# Patient Record
(demographics unavailable — no encounter records)

---

## 2025-01-07 NOTE — PHYSICAL EXAM
[Low Lying Soft Palate] : low lying soft palate [Enlarged Base of the Tongue] : enlargement of the base of the tongue [II] : II [Heart Sounds] : normal S1 and S2 [Murmurs] : no murmurs [] : no respiratory distress [Auscultation Breath Sounds / Voice Sounds] : lungs were clear to auscultation bilaterally [No Focal Deficits] : no focal deficits [Oriented To Time, Place, And Person] : oriented to person, place, and time [Memory Recent] : recent memory was not impaired

## 2025-01-07 NOTE — HISTORY OF PRESENT ILLNESS
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  Patient is sleepy with [place] sleepiness score of 11.  Patient does not know if he snores as he sleeps alone, does not know if there are witnessed apneas, when he sleeps in supine position he feels the airway is closing.  Patient's bedtime is around 9.30-10 PM wakes up in the morning around 5.30 AM.   He feels rested when he wakes up.  Patient drinks 2 cups of coffee during the daytime. Patient does not have any headaches or nocturia. He is not sleepy while driving. STOPBANG score - 3 neck size - 16.5 inches

## 2025-01-07 NOTE — ASSESSMENT
[FreeTextEntry1] : Assessment:  The patient's symptoms of daytime sleepiness ([place] Sleepiness Scale score of 11) and the sensation of airway closure in the supine position are concerning for possible obstructive sleep apnea (MAURO). However, the lack of reported snoring or witnessed apneas makes it more difficult to confirm the diagnosis.  Plan:  Sleep Study:  Proceed with a sleep study to assess for obstructive sleep apnea. If the apnea-hypopnea index (AHI) is below 15, a follow-up sleep study in the future can be considered. If AHI is above 15, initiate CPAP therapy. However, as the patient is hesitant to use CPAP, alternative options such as palate pillar therapy or an oral appliance may be explored. Patient Education:  Discussed the potential need for CPAP therapy if the sleep study confirms moderate or severe sleep apnea. Alternatives, such as palate pillar therapy or oral appliances, were also mentioned as options if CPAP is not well-tolerated. Emphasized the importance of evaluating sleep quality and the need for follow-up based on the results of the sleep study. Follow-up:  Schedule follow-up to review the results of the sleep study and discuss treatment options based on findings.

## 2025-01-31 NOTE — HEALTH RISK ASSESSMENT
[No] : No [No falls in past year] : Patient reported no falls in the past year [0] : 2) Feeling down, depressed, or hopeless: Not at all (0) [PHQ-2 Negative - No further assessment needed] : PHQ-2 Negative - No further assessment needed [Never] : Never [[id number]] : 0

## 2025-01-31 NOTE — PLAN
[FreeTextEntry1] : 57 yo M with 6 month follow up for HLD, prediabetes. Patient reports no acute changes or complaints at this time. He was evaluated by sleep medicine for MAURO. He has tried to make adjustments to diet and exercise but minor weight change noted.  Labs drawn in office Continue low carb diet for prediabetes Increase steps to 10k steps a day, walking, and treadmill if possible All questions answered Next visit for annual physical

## 2025-01-31 NOTE — HEALTH RISK ASSESSMENT
[No] : No [No falls in past year] : Patient reported no falls in the past year [0] : 2) Feeling down, depressed, or hopeless: Not at all (0) [PHQ-2 Negative - No further assessment needed] : PHQ-2 Negative - No further assessment needed [Never] : Never [[id number]] : 0

## 2025-01-31 NOTE — HISTORY OF PRESENT ILLNESS
[FreeTextEntry1] : f/u [de-identified] : 57 yo M with 6 month follow up for HLD, prediabetes. Patient reports no acute changes or complaints at this time. He was evaluated by sleep medicine for MAURO. He has tried to make adjustments to diet and exercise but minor weight change noted.

## 2025-01-31 NOTE — PLAN
[FreeTextEntry1] : 59 yo M with 6 month follow up for HLD, prediabetes. Patient reports no acute changes or complaints at this time. He was evaluated by sleep medicine for MAURO. He has tried to make adjustments to diet and exercise but minor weight change noted.  Labs drawn in office Continue low carb diet for prediabetes Increase steps to 10k steps a day, walking, and treadmill if possible All questions answered Next visit for annual physical

## 2025-01-31 NOTE — HISTORY OF PRESENT ILLNESS
[FreeTextEntry1] : f/u [de-identified] : 59 yo M with 6 month follow up for HLD, prediabetes. Patient reports no acute changes or complaints at this time. He was evaluated by sleep medicine for MAURO. He has tried to make adjustments to diet and exercise but minor weight change noted.

## 2025-01-31 NOTE — END OF VISIT
[Time Spent: ___ minutes] : I have spent [unfilled] minutes of time on the encounter which excludes teaching and separately reported services. SHIRA PANIAGUA RN